# Patient Record
Sex: MALE | Race: OTHER | NOT HISPANIC OR LATINO | URBAN - METROPOLITAN AREA
[De-identification: names, ages, dates, MRNs, and addresses within clinical notes are randomized per-mention and may not be internally consistent; named-entity substitution may affect disease eponyms.]

---

## 2023-08-01 ENCOUNTER — EMERGENCY (EMERGENCY)
Facility: HOSPITAL | Age: 18
LOS: 1 days | Discharge: HOME | End: 2023-08-01
Attending: STUDENT IN AN ORGANIZED HEALTH CARE EDUCATION/TRAINING PROGRAM
Payer: SELF-PAY

## 2023-08-01 VITALS
SYSTOLIC BLOOD PRESSURE: 121 MMHG | HEIGHT: 70 IN | HEART RATE: 65 BPM | WEIGHT: 110.01 LBS | RESPIRATION RATE: 19 BRPM | OXYGEN SATURATION: 99 % | TEMPERATURE: 98 F | DIASTOLIC BLOOD PRESSURE: 78 MMHG

## 2023-08-01 PROCEDURE — 73562 X-RAY EXAM OF KNEE 3: CPT | Mod: 26,LT

## 2023-08-01 PROCEDURE — 29125 APPL SHORT ARM SPLINT STATIC: CPT

## 2023-08-01 PROCEDURE — 73630 X-RAY EXAM OF FOOT: CPT

## 2023-08-01 PROCEDURE — 99284 EMERGENCY DEPT VISIT MOD MDM: CPT | Mod: 25

## 2023-08-01 PROCEDURE — 29125 APPL SHORT ARM SPLINT STATIC: CPT | Mod: LT

## 2023-08-01 PROCEDURE — 73130 X-RAY EXAM OF HAND: CPT | Mod: 26,LT

## 2023-08-01 PROCEDURE — 73080 X-RAY EXAM OF ELBOW: CPT | Mod: 26,LT

## 2023-08-01 PROCEDURE — 73110 X-RAY EXAM OF WRIST: CPT

## 2023-08-01 PROCEDURE — 73562 X-RAY EXAM OF KNEE 3: CPT

## 2023-08-01 PROCEDURE — 73130 X-RAY EXAM OF HAND: CPT

## 2023-08-01 PROCEDURE — 73080 X-RAY EXAM OF ELBOW: CPT

## 2023-08-01 PROCEDURE — 73630 X-RAY EXAM OF FOOT: CPT | Mod: 26,50

## 2023-08-01 PROCEDURE — 73110 X-RAY EXAM OF WRIST: CPT | Mod: 26,LT

## 2023-08-01 RX ORDER — ACETAMINOPHEN 500 MG
650 TABLET ORAL ONCE
Refills: 0 | Status: COMPLETED | OUTPATIENT
Start: 2023-08-01 | End: 2023-08-01

## 2023-08-01 RX ORDER — IBUPROFEN 200 MG
600 TABLET ORAL ONCE
Refills: 0 | Status: COMPLETED | OUTPATIENT
Start: 2023-08-01 | End: 2023-08-01

## 2023-08-01 RX ADMIN — Medication 650 MILLIGRAM(S): at 18:56

## 2023-08-01 RX ADMIN — Medication 600 MILLIGRAM(S): at 18:25

## 2023-08-01 RX ADMIN — Medication 650 MILLIGRAM(S): at 18:26

## 2023-08-01 RX ADMIN — Medication 600 MILLIGRAM(S): at 18:56

## 2023-08-01 NOTE — ED PROVIDER NOTE - CLINICAL SUMMARY MEDICAL DECISION MAKING FREE TEXT BOX
Patient presenting for s/p fall/ [place] head ct negative. neuro intact. noting pain to left arm and leg. noting + snuff box ttp. will obtain xray, ro fracture. thumb spica, ed obs and reassess

## 2023-08-01 NOTE — ED PROVIDER NOTE - NSFOLLOWUPINSTRUCTIONS_ED_ALL_ED_FT
Cast or Splint Care, Adult  Casts and splints are supports that are worn to protect broken bones and other injuries. A cast or splint may hold a bone still and in the correct position while it heals. Casts and splints may also help with pain, swelling, and muscle spasms.    A cast is a hardened support that is usually made of fiberglass or plaster. It is custom-fit to the body and offers more protection than a splint. Most casts cannot be taken off and put back on. A splint is a type of soft support that is usually made from cloth and elastic. It can be adjusted or taken off as needed. Often, when a bone is broken, a splint is put on until the swelling goes down, and then the splint is replaced by a cast.    You may need a cast or a splint if you:  Have a broken bone.  Have a soft-tissue injury.  Need to keep an injured body part from moving (keep it immobile) after surgery.  What are the risks?  In some cases, wearing a cast or splint can cause a reduced blood supply to the wrist or hand or to the foot and toes. This can happen if there is a lot of swelling or if the cast or splint is too tight. Limited blood supply results in a condition called compartment syndrome and can cause permanent damage. Symptoms include:  Pain that is getting worse.  Numbness and tingling.  Changes in skin color, including paleness or a bluish color.  Cold fingers or toes.  Other complications of wearing a cast or splint can include:  Skin irritation that can cause itching, rash, skin sores, or skin infection.  Limb stiffness or weakness.  How to care for a nonremovable cast or splint  A person checking the skin around a cast on his foot and lower leg.  Do not put pressure on any part of the cast or splint until it is fully hardened. This may take several hours.  Check the skin around the cast or splint every day. Tell your health care provider about any concerns.  Do not stick anything inside the cast or splint to scratch your skin. Doing that increases your risk of infection.  You may put lotion on dry skin around the edges of the cast or splint. Do not put lotion on the skin underneath the cast or splint.  Keep the cast or splint clean and dry.  How to care for a removable splint  Wear the splint as told by your health care provider. Remove it only as told by your health care provider.  Check the skin around the splint every day. Tell your health care provider about any concerns.  Loosen the splint if your fingers tingle, become numb, or turn cold and blue.  Keep the splint clean and dry. Clean your splint as told by your health care provider. Use mild soap and water and let it air-dry. Do not use heat on your splint.  Follow these instructions at home:  Bathing    Do not take baths, swim, or use a hot tub until your health care provider approves. Ask your health care provider if you may take showers. You may only be allowed to take sponge baths.  If your cast or splint is not waterproof:  Do not let it get wet.  Cover it with a watertight covering when you take a bath or shower.  Managing pain, stiffness, and swelling    A person resting with her lower leg elevated.   If directed, put ice on the affected area. To do this:  If you have a removable cast or splint, remove it as told by your health care provider.  Put ice in a plastic bag.  Place a towel between your skin and the bag or between your cast and the bag.  Leave the ice on for 20 minutes, 2–3 times a day.  Remove the ice if your skin turns bright red. This is very important. If you cannot feel pain, heat, or cold, you have a greater risk of damage to the area.  Move your fingers or toes often to reduce stiffness and swelling.  Raise (elevate) the injured area above the level of your heart while you are sitting or lying down.  Safety    Do not use the injured limb to support your body weight until your health care provider says that you can. Use crutches or other assistive devices as told by your health care provider.  Ask your health care provider when it is safe to drive if you have a cast or splint on part of your body.  General instructions    Take over-the-counter and prescription medicines only as told by your health care provider.  Return to your normal activities as told by your health care provider. Ask your health care provider what activities are safe for you.  Keep all follow-up visits. This is important.  Contact a health care provider if:  The skin around the cast or splint gets red or raw.  The skin under the cast is extremely itchy or painful.  Your cast or splint:  Gets damaged.  Feels very uncomfortable.  Is too tight or too loose.  Your cast becomes wet or develops a soft spot or area.  You notice a bad smell coming from under your cast.  You get an object stuck under your cast.  There is fluid leaking through the cast.  Get help right away if:  You develop any symptoms of compartment syndrome, such as:  Severe pain or pressure under the cast.  Numbness, tingling, coldness, or pale or bluish skin.  The part of your body above or below the cast is swollen and discolored.  You cannot feel or move your fingers or toes.  You have trouble breathing or shortness of breath.  You have chest pain.  Your pain gets worse.  These symptoms may represent a serious problem that is an emergency. Do not wait to see if the symptoms will go away. Get medical help right away. Call your local emergency services (911 in the [place] Do not drive yourself to the hospital.    Summary  Casts and splints are worn to protect broken bones and other injuries.  Casts and splints should remain clean and dry.  Remove your cast or splint only as told by your health care provider.  Get help right away if your pain gets worse, or if you have numbness, tingling, or skin that turns cold, blue, or discolored.  This information is not intended to replace advice given to you by your health care provider. Make sure you discuss any questions you have with your health care provider.

## 2023-08-01 NOTE — ED PROVIDER NOTE - PATIENT PORTAL LINK FT
You can access the FollowMyHealth Patient Portal offered by [facility] by registering at the following website: [email or web address]. By joining [facility]’s FollowMyHealth portal, you will also be able to view your health information using other applications (apps) compatible with our system.

## 2023-08-01 NOTE — ED ADULT NURSE NOTE - NSFALLUNIVINTERV_ED_ALL_ED
Bed/Stretcher in lowest position, wheels locked, appropriate side rails in place/Call bell, personal items and telephone in reach/Instruct patient to call for assistance before getting out of bed/chair/stretcher/Non-slip footwear applied when patient is off stretcher/[place] to call system/Physically safe environment - no spills, clutter or unnecessary equipment/Purposeful proactive rounding/Room/bathroom lighting operational, light cord in reach

## 2023-08-01 NOTE — ED PROVIDER NOTE - PROGRESS NOTE DETAILS
Patient xray negative. splinted due to concern for snuff box ttp. given ortho f.u, return precaution and instructed to fu pmd

## 2023-08-04 PROBLEM — Z00.00 ENCOUNTER FOR PREVENTIVE HEALTH EXAMINATION: Status: ACTIVE | Noted: 2023-08-04

## 2023-08-04 PROBLEM — Z78.9 OTHER SPECIFIED HEALTH STATUS: Chronic | Status: ACTIVE | Noted: 2023-08-01

## 2023-08-07 ENCOUNTER — APPOINTMENT (OUTPATIENT)
Dept: ORTHOPEDIC SURGERY | Facility: CLINIC | Age: 18
End: 2023-08-07

## 2024-09-23 NOTE — ED ADULT TRIAGE NOTE - HEIGHT IN CM
177.8 General Sunscreen Counseling: I recommended a broad spectrum sunscreen with a SPF of 30 or higher.  Sunscreens should be applied at least 15 minutes prior to expected sun exposure and then every 2 hours after that as long as sun exposure continues. If swimming or sweating, need to use a water resistant sunblock and it should have an 80 minute time listed on the bottle. Need to reapply every 80 minutes in those instances. Encouraged sun protective clothing and hats. Detail Level: Generalized Products Recommended: Elta Sunblock daily recommended.